# Patient Record
Sex: FEMALE | URBAN - METROPOLITAN AREA
[De-identification: names, ages, dates, MRNs, and addresses within clinical notes are randomized per-mention and may not be internally consistent; named-entity substitution may affect disease eponyms.]

---

## 2021-11-22 ENCOUNTER — TELEPHONE ENCOUNTER (OUTPATIENT)
Dept: URBAN - METROPOLITAN AREA CLINIC 72 | Facility: CLINIC | Age: 86
End: 2021-11-22

## 2021-12-28 ENCOUNTER — OFFICE VISIT (OUTPATIENT)
Dept: URBAN - METROPOLITAN AREA CLINIC 72 | Facility: CLINIC | Age: 86
End: 2021-12-28
Payer: MEDICARE

## 2021-12-28 ENCOUNTER — WEB ENCOUNTER (OUTPATIENT)
Dept: URBAN - METROPOLITAN AREA CLINIC 72 | Facility: CLINIC | Age: 86
End: 2021-12-28

## 2021-12-28 ENCOUNTER — DASHBOARD ENCOUNTERS (OUTPATIENT)
Age: 86
End: 2021-12-28

## 2021-12-28 VITALS
TEMPERATURE: 97.4 F | SYSTOLIC BLOOD PRESSURE: 132 MMHG | WEIGHT: 205 LBS | HEIGHT: 63 IN | DIASTOLIC BLOOD PRESSURE: 71 MMHG | HEART RATE: 96 BPM | BODY MASS INDEX: 36.32 KG/M2

## 2021-12-28 DIAGNOSIS — K86.2 PANCREATIC CYST: ICD-10-CM

## 2021-12-28 DIAGNOSIS — R10.13 EPIGASTRIC ABDOMINAL PAIN: ICD-10-CM

## 2021-12-28 PROCEDURE — 99204 OFFICE O/P NEW MOD 45 MIN: CPT | Performed by: INTERNAL MEDICINE

## 2021-12-28 RX ORDER — PANTOPRAZOLE SODIUM 40 MG/1
1 TABLET TABLET, DELAYED RELEASE ORAL ONCE A DAY
Status: ACTIVE | COMMUNITY

## 2021-12-28 RX ORDER — FAMOTIDINE 40 MG/1
1 TABLET AT BEDTIME TABLET, FILM COATED ORAL ONCE A DAY
Qty: 30 | OUTPATIENT
Start: 2021-12-28

## 2021-12-28 RX ORDER — LOSARTAN POTASSIUM AND HYDROCHLOROTHIAZIDE 100; 25 MG/1; MG/1
1 TABLET TABLET, FILM COATED ORAL ONCE A DAY
Status: ACTIVE | COMMUNITY

## 2021-12-28 RX ORDER — DICYCLOMINE HYDROCHLORIDE 10 MG/1
1 TABLET CAPSULE ORAL THREE TIMES A DAY
Status: ACTIVE | COMMUNITY

## 2021-12-28 NOTE — HPI-TODAY'S VISIT:
Mrs. Carroll is a pleasant 86-year-old female who presents as a new patient for consultation for epigastric pain, reflux and pancreatic cyst.  She was referred by Dr. Donita Kunz.  She reports that for approximately 6 weeks she has been experiencing epigastric pain and almost chest pain.  She thought she may be having a heart attack.  She went to the ER for her evaluation there was negative and it was deemed that she had reflux.  She does endorse worsening reflux as well.  She is on pantoprazole which does seem to control the symptoms, she has recently increased the pantoprazole to twice a day which does seem to help.  She has never tried Pepcid.  She denies any dysphagia.  She is concerned because when the discomfort occurs it does cause her to get anxiety.  It is interfering with her activities of daily living.  She denies any weight loss.  She does endorse weight gain due to Covid isolation.  She has tried some lifestyle modifications including avoiding eating late and elevation of the head of bed.  She does feel these are helping somewhat.  Review of records: CT scan of the abdomen and pelvis done in the emergency department at [facility] on 11/12/2021 with contrast revealed an enlarged liver at 17.8 cm craniocaudally.  Right hepatic lobe cyst several small by lobar liver cysts up to 8 mm in size some too small to characterize.  Normal bile ducts and gallbladder.  Pancreas revealed a cystic lesion arising from the distal pancreas and pancreatic tail approximately 1.5 cm in diameter otherwise atrophic appearance.  Spleen was enlarged at 13.4 cm in longest dimension renal cysts.  Colonic diverticulosis. Lab work done at same ER visit revealed WBC 7.0, hemoglobin 11.5, MCV 81, hematocrit 36.5, platelet 270, sodium 137, potassium 3.8, bicarb 26, BUN 17.4, creatinine 0.9, alkaline phosphatase 58, ALT 29, AST 30, bilirubin 0.5, lipase 144

## 2022-01-21 ENCOUNTER — ERX REFILL RESPONSE (OUTPATIENT)
Dept: URBAN - METROPOLITAN AREA CLINIC 72 | Facility: CLINIC | Age: 87
End: 2022-01-21

## 2022-01-21 RX ORDER — FAMOTIDINE 40 MG/1
TAKE 1 TABLET BY MOUTH EVERY EVENING AT BEDTIME TABLET, FILM COATED ORAL
Qty: 30 TABLET | Refills: 1 | OUTPATIENT

## 2022-01-21 RX ORDER — FAMOTIDINE 40 MG/1
1 TABLET AT BEDTIME TABLET, FILM COATED ORAL ONCE A DAY
Qty: 30 | OUTPATIENT

## 2022-02-15 ENCOUNTER — ERX REFILL RESPONSE (OUTPATIENT)
Dept: URBAN - METROPOLITAN AREA CLINIC 72 | Facility: CLINIC | Age: 87
End: 2022-02-15

## 2022-02-15 RX ORDER — FAMOTIDINE 40 MG/1
TAKE 1 TABLET BY MOUTH EVERY EVENING AT BEDTIME TABLET, FILM COATED ORAL
Qty: 30 TABLET | Refills: 1 | OUTPATIENT